# Patient Record
Sex: MALE | Race: WHITE
[De-identification: names, ages, dates, MRNs, and addresses within clinical notes are randomized per-mention and may not be internally consistent; named-entity substitution may affect disease eponyms.]

---

## 2018-03-28 ENCOUNTER — HOSPITAL ENCOUNTER (OUTPATIENT)
Dept: HOSPITAL 45 - C.NEUR | Age: 23
Discharge: HOME | End: 2018-03-28
Attending: PSYCHIATRY & NEUROLOGY
Payer: COMMERCIAL

## 2018-03-28 DIAGNOSIS — Z51.81: Primary | ICD-10-CM

## 2018-03-28 DIAGNOSIS — G40.909: ICD-10-CM

## 2018-03-28 NOTE — EEG PROCEDURE NOTE
EEG Procedure Note


Date of Service


Mar 28, 2018.





Start / End Times


Start Time:  10:32 AM


End Time:  10:53 AM





Referring Physician


Kalyan Vera





History


This is a 23-year-old male with reported history of seizure disorder.  EEG for 

further evaluation of epilepsy.





Home Medication List


Scheduled


Lamotrigine (Lamictal), 375 MG PO BID


Lamotrigine (Lamictal), 150 MG PO AS DIRECTED





Description


This is a 21 electrode EEG with a single channel dedicated to limited EKG. The 

electrodes were placed in accordance with the International 10-20 system.





At the start of the recording the patient was in an awake state. Background was 

well organized and composed of symmetric mixed alpha and beta frequencies. 

There was a symmetric well-formed moderate amplitude 8-9Hz posterior dominant 

rhythm that was reactive to eye opening and closure.  Hyperventilation was not 

done. Intermittent photic stimulation at various frequencies produced no 

abnormalities.  Drowsiness was indicated by loss of muscle artifact and slowing 

of the background rhythm with vertex waves.  There was no stage II sleep 

transients.





Interpretation


This is a normal awake and drowsy routine EEG. 





There was no electrographic seizures or epileptiform discharges.





Clinical Correlation


A normal EEG does not rule out epilepsy if there is a strong clinical suspicion.